# Patient Record
Sex: FEMALE | Race: WHITE | NOT HISPANIC OR LATINO | URBAN - METROPOLITAN AREA
[De-identification: names, ages, dates, MRNs, and addresses within clinical notes are randomized per-mention and may not be internally consistent; named-entity substitution may affect disease eponyms.]

---

## 2023-06-29 DIAGNOSIS — F32.A DEPRESSION, UNSPECIFIED DEPRESSION TYPE: Primary | ICD-10-CM

## 2023-06-29 RX ORDER — PAROXETINE 30 MG/1
TABLET, FILM COATED ORAL
Qty: 90 TABLET | Refills: 2 | Status: SHIPPED | OUTPATIENT
Start: 2023-06-29 | End: 2023-09-20 | Stop reason: SINTOL

## 2023-09-20 ENCOUNTER — OFFICE VISIT (OUTPATIENT)
Dept: PRIMARY CARE | Facility: CLINIC | Age: 33
End: 2023-09-20
Payer: COMMERCIAL

## 2023-09-20 VITALS
BODY MASS INDEX: 19.61 KG/M2 | RESPIRATION RATE: 16 BRPM | OXYGEN SATURATION: 98 % | SYSTOLIC BLOOD PRESSURE: 110 MMHG | TEMPERATURE: 97.8 F | DIASTOLIC BLOOD PRESSURE: 62 MMHG | HEIGHT: 66 IN | WEIGHT: 122 LBS | HEART RATE: 80 BPM

## 2023-09-20 DIAGNOSIS — F41.9 ANXIETY: Primary | ICD-10-CM

## 2023-09-20 DIAGNOSIS — Z20.2 EXPOSURE TO STD: ICD-10-CM

## 2023-09-20 PROBLEM — R68.89 FLU-LIKE SYMPTOMS: Status: RESOLVED | Noted: 2023-09-20 | Resolved: 2023-09-20

## 2023-09-20 PROBLEM — T78.40XA ALLERGIES: Status: ACTIVE | Noted: 2023-09-20

## 2023-09-20 PROBLEM — J02.9 SORE THROAT: Status: RESOLVED | Noted: 2023-09-20 | Resolved: 2023-09-20

## 2023-09-20 PROBLEM — E07.9 THYROID DISORDER: Status: RESOLVED | Noted: 2023-09-20 | Resolved: 2023-09-20

## 2023-09-20 PROCEDURE — 99213 OFFICE O/P EST LOW 20 MIN: CPT | Performed by: FAMILY MEDICINE

## 2023-09-20 PROCEDURE — 87491 CHLMYD TRACH DNA AMP PROBE: CPT

## 2023-09-20 PROCEDURE — 87591 N.GONORRHOEAE DNA AMP PROB: CPT

## 2023-09-20 PROCEDURE — 1036F TOBACCO NON-USER: CPT | Performed by: FAMILY MEDICINE

## 2023-09-20 RX ORDER — ESCITALOPRAM OXALATE 5 MG/1
5 TABLET ORAL DAILY
Qty: 30 TABLET | Refills: 11 | Status: SHIPPED | OUTPATIENT
Start: 2023-09-20 | End: 2024-05-02 | Stop reason: ALTCHOICE

## 2023-09-20 ASSESSMENT — ENCOUNTER SYMPTOMS
EYES NEGATIVE: 1
PSYCHIATRIC NEGATIVE: 1
ENDOCRINE NEGATIVE: 1
GASTROINTESTINAL NEGATIVE: 1
NEUROLOGICAL NEGATIVE: 1
CONSTITUTIONAL NEGATIVE: 1
CARDIOVASCULAR NEGATIVE: 1
RESPIRATORY NEGATIVE: 1

## 2023-09-20 NOTE — PROGRESS NOTES
"Subjective     Patient ID: Leticia Wei is a 32 y.o. female who presents for Anxiety and Exposure to STD.  HPI  Got side effects from paxil. Dilated pupils.  Would like to try a different medication.     Review of Systems   Constitutional: Negative.    HENT: Negative.     Eyes: Negative.    Respiratory: Negative.     Cardiovascular: Negative.    Gastrointestinal: Negative.    Endocrine: Negative.    Genitourinary: Negative.    Skin: Negative.    Neurological: Negative.    Psychiatric/Behavioral: Negative.           Objective     Vitals:    09/20/23 1553   BP: 110/62   BP Location: Left arm   Patient Position: Sitting   Pulse: 80   Resp: 16   Temp: 36.6 °C (97.8 °F)   SpO2: 98%   Weight: 55.3 kg (122 lb)   Height: 1.676 m (5' 6\")        Current Outpatient Medications   Medication Instructions    escitalopram (LEXAPRO) 5 mg, oral, Daily        Physical Exam  Constitutional:       Appearance: Normal appearance.   HENT:      Head: Normocephalic and atraumatic.      Mouth/Throat:      Mouth: Mucous membranes are moist.   Cardiovascular:      Rate and Rhythm: Normal rate and regular rhythm.   Pulmonary:      Effort: Pulmonary effort is normal.      Breath sounds: Normal breath sounds.   Skin:     General: Skin is warm and dry.   Neurological:      Mental Status: She is alert.         Assessment/Plan   Problem List Items Addressed This Visit    None  Visit Diagnoses       Anxiety    -  Primary    Relevant Medications    escitalopram (Lexapro) 5 mg tablet    Exposure to STD        Relevant Orders    C. Trachomatis / N. Gonorrhoeae, Amplified Detection                               "

## 2023-09-21 ENCOUNTER — TELEPHONE (OUTPATIENT)
Dept: PRIMARY CARE | Facility: CLINIC | Age: 33
End: 2023-09-21
Payer: COMMERCIAL

## 2023-09-22 ENCOUNTER — TELEPHONE (OUTPATIENT)
Dept: PRIMARY CARE | Facility: CLINIC | Age: 33
End: 2023-09-22
Payer: COMMERCIAL

## 2023-09-22 DIAGNOSIS — A74.9 CHLAMYDIA: Primary | ICD-10-CM

## 2023-09-22 LAB
CHLAMYDIA TRACH., AMPLIFIED: POSITIVE
N. GONORRHEA, AMPLIFIED: NEGATIVE

## 2023-09-22 RX ORDER — AZITHROMYCIN 250 MG/1
1000 TABLET, FILM COATED ORAL ONCE
Qty: 4 TABLET | Refills: 0 | Status: SHIPPED | OUTPATIENT
Start: 2023-09-22 | End: 2023-09-22

## 2023-09-22 NOTE — TELEPHONE ENCOUNTER
Pt is calling about her lab results. Pt states she can see result on MyChart and has some positive tests. She wants to speak with medical staff asap.

## 2024-01-22 ENCOUNTER — APPOINTMENT (OUTPATIENT)
Dept: PRIMARY CARE | Facility: CLINIC | Age: 34
End: 2024-01-22
Payer: COMMERCIAL

## 2024-05-02 ENCOUNTER — OFFICE VISIT (OUTPATIENT)
Dept: PRIMARY CARE | Facility: CLINIC | Age: 34
End: 2024-05-02
Payer: COMMERCIAL

## 2024-05-02 VITALS
WEIGHT: 138 LBS | TEMPERATURE: 97.9 F | RESPIRATION RATE: 18 BRPM | HEART RATE: 86 BPM | OXYGEN SATURATION: 96 % | DIASTOLIC BLOOD PRESSURE: 78 MMHG | HEIGHT: 65 IN | SYSTOLIC BLOOD PRESSURE: 120 MMHG | BODY MASS INDEX: 22.99 KG/M2

## 2024-05-02 DIAGNOSIS — Z00.00 HEALTHCARE MAINTENANCE: Primary | ICD-10-CM

## 2024-05-02 LAB
POC APPEARANCE, URINE: CLEAR
POC BILIRUBIN, URINE: NEGATIVE
POC BLOOD, URINE: NEGATIVE
POC COLOR, URINE: NORMAL
POC GLUCOSE, URINE: NEGATIVE MG/DL
POC KETONES, URINE: NEGATIVE MG/DL
POC LEUKOCYTES, URINE: NEGATIVE
POC NITRITE,URINE: NEGATIVE
POC PH, URINE: 6 PH
POC PROTEIN, URINE: NEGATIVE MG/DL
POC SPECIFIC GRAVITY, URINE: 1.01
POC UROBILINOGEN, URINE: 0.2 EU/DL

## 2024-05-02 PROCEDURE — 1036F TOBACCO NON-USER: CPT | Performed by: FAMILY MEDICINE

## 2024-05-02 PROCEDURE — 81002 URINALYSIS NONAUTO W/O SCOPE: CPT | Performed by: FAMILY MEDICINE

## 2024-05-02 PROCEDURE — 93000 ELECTROCARDIOGRAM COMPLETE: CPT | Performed by: FAMILY MEDICINE

## 2024-05-02 PROCEDURE — 99395 PREV VISIT EST AGE 18-39: CPT | Performed by: FAMILY MEDICINE

## 2024-05-02 ASSESSMENT — ENCOUNTER SYMPTOMS
NEUROLOGICAL NEGATIVE: 1
GASTROINTESTINAL NEGATIVE: 1
CONSTITUTIONAL NEGATIVE: 1
CARDIOVASCULAR NEGATIVE: 1
RESPIRATORY NEGATIVE: 1
PSYCHIATRIC NEGATIVE: 1
EYES NEGATIVE: 1
ENDOCRINE NEGATIVE: 1

## 2024-05-02 ASSESSMENT — PROMIS GLOBAL HEALTH SCALE
CARRYOUT_PHYSICAL_ACTIVITIES: COMPLETELY
RATE_SOCIAL_SATISFACTION: FAIR
RATE_AVERAGE_PAIN: 3
CARRYOUT_SOCIAL_ACTIVITIES: GOOD
RATE_MENTAL_HEALTH: FAIR
RATE_PHYSICAL_HEALTH: GOOD
RATE_QUALITY_OF_LIFE: GOOD
EMOTIONAL_PROBLEMS: OFTEN
RATE_GENERAL_HEALTH: GOOD
RATE_AVERAGE_FATIGUE: MILD

## 2024-05-02 ASSESSMENT — PATIENT HEALTH QUESTIONNAIRE - PHQ9
1. LITTLE INTEREST OR PLEASURE IN DOING THINGS: NOT AT ALL
SUM OF ALL RESPONSES TO PHQ9 QUESTIONS 1 AND 2: 0
2. FEELING DOWN, DEPRESSED OR HOPELESS: NOT AT ALL

## 2024-05-02 NOTE — PROGRESS NOTES
"Subjective     Patient ID: Leticia Wei is a 33 y.o. female who presents for Annual Exam.  HPI Stopped lexapro because it was not effective. Doing well with lifestyle management.    Review of Systems   Constitutional: Negative.    HENT: Negative.     Eyes: Negative.    Respiratory: Negative.     Cardiovascular: Negative.    Gastrointestinal: Negative.    Endocrine: Negative.    Genitourinary: Negative.    Skin: Negative.    Neurological: Negative.    Psychiatric/Behavioral: Negative.         Objective     Vitals:    05/02/24 1120   BP: 120/78   BP Location: Right arm   Patient Position: Sitting   Pulse: 86   Resp: 18   Temp: 36.6 °C (97.9 °F)   TempSrc: Temporal   SpO2: 96%   Weight: 62.6 kg (138 lb)   Height: 1.651 m (5' 5\")        Current Outpatient Medications   Medication Instructions    escitalopram (LEXAPRO) 5 mg, oral, Daily        Physical Exam  Constitutional:       General: She is not in acute distress.     Appearance: Normal appearance.   HENT:      Head: Normocephalic and atraumatic.      Right Ear: Tympanic membrane normal.      Left Ear: Tympanic membrane normal.      Nose: Nose normal.      Mouth/Throat:      Mouth: Mucous membranes are dry.      Pharynx: Oropharynx is clear.   Eyes:      Conjunctiva/sclera: Conjunctivae normal.      Pupils: Pupils are equal, round, and reactive to light.   Neck:      Vascular: No carotid bruit.   Cardiovascular:      Rate and Rhythm: Normal rate and regular rhythm.      Heart sounds: Normal heart sounds.   Pulmonary:      Effort: Pulmonary effort is normal.      Breath sounds: Normal breath sounds.   Abdominal:      General: Bowel sounds are normal.      Palpations: Abdomen is soft.   Musculoskeletal:      Cervical back: Neck supple. No tenderness.   Skin:     General: Skin is warm and dry.   Neurological:      General: No focal deficit present.      Mental Status: She is alert.   Psychiatric:         Mood and Affect: Mood normal.         Behavior: Behavior " normal.         Assessment/Plan   Diagnoses and all orders for this visit:  Healthcare maintenance  -     POCT UA (nonautomated) manually resulted  -     ECG 12 lead (Clinic Performed)  -     CBC; Future  -     Comprehensive Metabolic Panel; Future  -     Lipid Panel; Future

## 2024-05-16 ENCOUNTER — APPOINTMENT (OUTPATIENT)
Dept: PRIMARY CARE | Facility: CLINIC | Age: 34
End: 2024-05-16
Payer: COMMERCIAL

## 2024-09-30 ENCOUNTER — APPOINTMENT (OUTPATIENT)
Dept: OBSTETRICS AND GYNECOLOGY | Facility: CLINIC | Age: 34
End: 2024-09-30
Payer: COMMERCIAL

## 2024-10-02 DIAGNOSIS — F41.9 ANXIETY: Primary | ICD-10-CM

## 2024-10-02 RX ORDER — ESCITALOPRAM OXALATE 20 MG/1
20 TABLET ORAL DAILY
Qty: 30 TABLET | Refills: 11 | Status: SHIPPED | OUTPATIENT
Start: 2024-10-02 | End: 2025-10-02

## 2024-10-16 ENCOUNTER — APPOINTMENT (OUTPATIENT)
Dept: DERMATOLOGY | Facility: CLINIC | Age: 34
End: 2024-10-16
Payer: COMMERCIAL

## 2024-10-16 DIAGNOSIS — Z12.83 ENCOUNTER FOR SCREENING FOR MALIGNANT NEOPLASM OF SKIN: ICD-10-CM

## 2024-10-16 DIAGNOSIS — D48.5 NEOPLASM OF UNCERTAIN BEHAVIOR OF SKIN: ICD-10-CM

## 2024-10-16 DIAGNOSIS — L81.4 LENTIGO: ICD-10-CM

## 2024-10-16 DIAGNOSIS — D22.9 MELANOCYTIC NEVUS, UNSPECIFIED LOCATION: ICD-10-CM

## 2024-10-16 DIAGNOSIS — L71.9 ROSACEA: Primary | ICD-10-CM

## 2024-10-16 PROCEDURE — 99204 OFFICE O/P NEW MOD 45 MIN: CPT | Performed by: DERMATOLOGY

## 2024-10-16 PROCEDURE — 11102 TANGNTL BX SKIN SINGLE LES: CPT | Performed by: DERMATOLOGY

## 2024-10-16 RX ORDER — PROPRANOLOL HYDROCHLORIDE 10 MG/1
10 TABLET ORAL DAILY
Qty: 30 TABLET | Refills: 11 | Status: SHIPPED | OUTPATIENT
Start: 2024-10-16 | End: 2025-10-16

## 2024-10-16 ASSESSMENT — PATIENT GLOBAL ASSESSMENT (PGA): WHAT IS THE PGA: PATIENT GLOBAL ASSESSMENT:  1 - CLEAR

## 2024-10-16 ASSESSMENT — DERMATOLOGY QUALITY OF LIFE (QOL) ASSESSMENT
WHAT SINGLE SKIN CONDITION LISTED BELOW IS THE PATIENT ANSWERING THE QUALITY-OF-LIFE ASSESSMENT QUESTIONS ABOUT: NONE OF THE ABOVE
RATE HOW BOTHERED YOU ARE BY SYMPTOMS OF YOUR SKIN PROBLEM (EG, ITCHING, STINGING BURNING, HURTING OR SKIN IRRITATION): 2
RATE HOW BOTHERED YOU ARE BY SYMPTOMS OF YOUR SKIN PROBLEM (EG, ITCHING, STINGING BURNING, HURTING OR SKIN IRRITATION): 2
RATE HOW BOTHERED YOU ARE BY EFFECTS OF YOUR SKIN PROBLEMS ON YOUR ACTIVITIES (EG, GOING OUT, ACCOMPLISHING WHAT YOU WANT, WORK ACTIVITIES OR YOUR RELATIONSHIPS WITH OTHERS): 0 - NEVER BOTHERED
RATE HOW BOTHERED YOU ARE BY EFFECTS OF YOUR SKIN PROBLEMS ON YOUR ACTIVITIES (EG, GOING OUT, ACCOMPLISHING WHAT YOU WANT, WORK ACTIVITIES OR YOUR RELATIONSHIPS WITH OTHERS): 0 - NEVER BOTHERED
WHAT SINGLE SKIN CONDITION LISTED BELOW IS THE PATIENT ANSWERING THE QUALITY-OF-LIFE ASSESSMENT QUESTIONS ABOUT: NONE OF THE ABOVE
DATE THE QUALITY-OF-LIFE ASSESSMENT WAS COMPLETED: 67129
RATE HOW EMOTIONALLY BOTHERED YOU ARE BY YOUR SKIN PROBLEM (FOR EXAMPLE, WORRY, EMBARRASSMENT, FRUSTRATION): 0 - NEVER BOTHERED
RATE HOW EMOTIONALLY BOTHERED YOU ARE BY YOUR SKIN PROBLEM (FOR EXAMPLE, WORRY, EMBARRASSMENT, FRUSTRATION): 0 - NEVER BOTHERED

## 2024-10-16 NOTE — PROGRESS NOTES
Subjective     Leticia Wei is a 33 y.o. female who presents for the following: Skin Check (New. No reported personal or family hx. ).     Skin Cancer Screening  She has a history of heavy sun exposure. She is in the sun frequently. She uses sunscreen frequently. She reports no skin symptoms. Her moles are not changing.    Spots that concern her: Left Upper Arm.           Review of Systems:  No other skin or systemic complaints other than what is documented elsewhere in the note.    The following portions of the chart were reviewed this encounter and updated as appropriate:       Skin Cancer History  No skin cancer on file.    Specialty Problems    None    Past Medical History:  Leticia Wei  has a past medical history of ADHD (attention deficit hyperactivity disorder), Anxiety, Flu-like symptoms (09/20/2023), and Thyroid disorder (09/20/2023).    Past Surgical History:  Leticia Wei  has a past surgical history that includes Other surgical history (06/18/2020).    Family History:  Patient family history is not on file.       Objective   Well appearing patient in no apparent distress; mood and affect are within normal limits.    A full examination was performed including scalp, head, eyes, ears, nose, lips, neck, chest, axillae, abdomen, back, buttocks, bilateral upper extremities, bilateral lower extremities, hands, feet, fingers, toes, fingernails, and toenails. All findings within normal limits unless otherwise noted below.    Assessment/Plan   1. Melanocytic nevus, unspecified location  All nevi were symmetric brown macules without atypia on dermoscopy.     The ABCDEs of melanoma and warning signs of non-melanoma skin cancer were discussed with patient and patient expressed understanding. Sun protection and use of at least SPF 30 discussed with patient. Pt instructed to reapply every 2 hours.     2. Lentigo  Scattered tan macules in sun-exposed areas.    The ABCDEs of melanoma and warning signs of  non-melanoma skin cancer were discussed with patient and patient expressed understanding. Sun protection and use of at least SPF 30 discussed with patient. Pt instructed to reapply every 2 hours.     3. Neoplasm of uncertain behavior of skin  Left Lower Back  Variegated brown macule          Lesion biopsy  Type of biopsy: tangential    Informed consent: discussed and consent obtained    Timeout: patient name, date of birth, surgical site, and procedure verified    Procedure prep:  Patient was prepped and draped  Anesthesia: the lesion was anesthetized in a standard fashion    Anesthetic:  1% lidocaine w/ epinephrine 1-100,000 local infiltration  Instrument used: DermaBlade    Hemostasis achieved with: aluminum chloride    Outcome: patient tolerated procedure well    Post-procedure details: sterile dressing applied and wound care instructions given    Dressing type: petrolatum and bandage      Staff Communication: Dermatology Local Anesthesia: 1 % Lidocaine / Epinephrine - Amount: 1 mL    Specimen 1 - Dermatopathology- DERM LAB  Differential Diagnosis: r/o dysplastic nevus vs melanoma  Check Margins Yes/No?:    Comments:    Dermpath Lab: Routine Histopathology (formalin-fixed tissue)    4. Rosacea  Head - Anterior (Face)  Mid face erythema with telangiectasias and significant flushing that interferes with her everyday life.     The nature of the diagnosis was explained to patient. Will plan to treat with propranolol since it is flushing vs fixed telangectasias that are more of the issue. If no improvement, can also consider increasing dose vs vbeam. Risks, benefits, side effects, alternatives and options were discussed with patient and the patient voiced understanding.    Pt agrees with plan as above.       Related Medications  propranolol (Inderal) 10 mg tablet  Take 1 tablet (10 mg) by mouth once daily.       Follow up pending biopsy results.

## 2024-10-18 LAB
LABORATORY COMMENT REPORT: NORMAL
PATH REPORT.FINAL DX SPEC: NORMAL
PATH REPORT.GROSS SPEC: NORMAL
PATH REPORT.RELEVANT HX SPEC: NORMAL
PATH REPORT.TOTAL CANCER: NORMAL

## 2025-01-07 ENCOUNTER — APPOINTMENT (OUTPATIENT)
Dept: OBSTETRICS AND GYNECOLOGY | Facility: CLINIC | Age: 35
End: 2025-01-07
Payer: COMMERCIAL

## 2025-04-01 ENCOUNTER — APPOINTMENT (OUTPATIENT)
Dept: OBSTETRICS AND GYNECOLOGY | Facility: CLINIC | Age: 35
End: 2025-04-01
Payer: COMMERCIAL

## 2025-04-14 ENCOUNTER — APPOINTMENT (OUTPATIENT)
Dept: OBSTETRICS AND GYNECOLOGY | Facility: CLINIC | Age: 35
End: 2025-04-14
Payer: COMMERCIAL

## 2025-05-20 DIAGNOSIS — G47.00 INSOMNIA, UNSPECIFIED TYPE: Primary | ICD-10-CM

## 2025-05-20 RX ORDER — TRAZODONE HYDROCHLORIDE 50 MG/1
50 TABLET ORAL NIGHTLY PRN
Qty: 30 TABLET | Refills: 5 | Status: SHIPPED | OUTPATIENT
Start: 2025-05-20 | End: 2026-05-20

## 2025-05-26 ENCOUNTER — OFFICE VISIT (OUTPATIENT)
Dept: URGENT CARE | Age: 35
End: 2025-05-26
Payer: COMMERCIAL

## 2025-05-26 VITALS
HEART RATE: 67 BPM | BODY MASS INDEX: 22.96 KG/M2 | OXYGEN SATURATION: 100 % | WEIGHT: 138 LBS | SYSTOLIC BLOOD PRESSURE: 142 MMHG | DIASTOLIC BLOOD PRESSURE: 102 MMHG | TEMPERATURE: 98 F | RESPIRATION RATE: 16 BRPM

## 2025-05-26 DIAGNOSIS — S01.81XA LACERATION OF FOREHEAD, INITIAL ENCOUNTER: Primary | ICD-10-CM

## 2025-05-26 PROCEDURE — 1036F TOBACCO NON-USER: CPT | Performed by: FAMILY MEDICINE

## 2025-05-26 PROCEDURE — 99203 OFFICE O/P NEW LOW 30 MIN: CPT | Performed by: FAMILY MEDICINE

## 2025-05-26 NOTE — PROGRESS NOTES
Subjective   Patient ID: Leticia Wei is a 34 y.o. female. They present today with a chief complaint of Injury (Had a fall on 3 days ago has a laceration on forehead).    Patient disposition: Home    History of Present Illness  HPI  Forehead laceration from 3 days ago.  Patient has recently started a new sleeping pill, trazodone.  Went home, took a pill and took her dog for a walk.  Fell asleep but then woke up in the morning with a laceration on her left forehead.  Has been keeping it clean, apply Neosporin.  No headache, dizziness new symptoms.  Denies any injury overnight but thinks she may have gotten up overnight.      Past Medical History  Allergies as of 05/26/2025 - Reviewed 05/26/2025   Allergen Reaction Noted    Penicillins Unknown 09/20/2023       Prescriptions Prior to Admission[1]     Current Medications[2]    Problem List[3]    Surgical History[4]     reports that she has never smoked. She has never used smokeless tobacco. She reports current alcohol use. She reports that she does not use drugs.    Review of Systems  As noted in HPI. ROS otherwise negative unless noted.       Objective    Vitals:    05/26/25 0945   BP: (!) 142/102   Pulse: 67   Resp: 16   Temp: 36.7 °C (98 °F)   SpO2: 100%   Weight: 62.6 kg (138 lb)     Patient's last menstrual period was 05/15/2025.    Physical Exam  Constitutional: vital signs reviewed. Well developed, well nourished. patient alert and patient without distress.   Psych: Normal mood and affect  Skin: Normal skin color and pigmentation, normal skin turgor, and no rash.  Left forehead with 2 cm diagonal laceration, linear, slightly dehisced about 2 mm and has already scabbed over.  No dehiscence.  Area was further cleansed and scrubbed with alcohol and peroxide to help open the wound.  There is no bleeding.  Healing tissue present.  Tincture and Steri-Strips were applied to the area with better approximation of the wound.  Lymphatic: No extremity  edema  Cardiovascular: No edema in the extremities. Normal skin color/perfusion.   Pulmonary: Skin without cyanosis. Patient without respiratory distress. Speaking in full sentences.  Musculoskeletal: Normal gait and stance, balance and coordination without gross deficit.        Procedures    Point of Care Test & Imaging Results from this visit           Diagnostic study results (if any) were reviewed.  (If applicable) preliminary radiology reading: None    Assessment/Plan   Allergies, medications, history, and pertinent labs/EKGs/Imaging reviewed.        Medical Decision Making  See note    Orders and Diagnoses  There are no diagnoses linked to this encounter.    Medical Admin Record      Follow Up Instructions  No follow-ups on file.    At time of discharge patient was clinically well-appearing and HDS for outpatient management. The patient and/or family was educated regarding diagnosis, supportive care, OTC and Rx medications. The patient and/or family was given the opportunity to ask questions prior to discharge and all questions answered. They verbalized understanding of my discussion of the plans for treatment, expected course, indications to return to  or seek further evaluation in ED, and the need for timely follow up as directed.      Electronically signed by [facility]           [1] (Not in a hospital admission)  [2]   Current Outpatient Medications   Medication Sig Dispense Refill    traZODone (Desyrel) 50 mg tablet Take 1 tablet (50 mg) by mouth as needed at bedtime for sleep. 30 tablet 5    escitalopram (Lexapro) 20 mg tablet Take 1 tablet (20 mg) by mouth once daily. 30 tablet 11    propranolol (Inderal) 10 mg tablet Take 1 tablet (10 mg) by mouth once daily. (Patient not taking: Reported on 5/26/2025) 30 tablet 11     No current facility-administered medications for this visit.   [3]   Patient Active Problem List  Diagnosis    Allergies   [4]   Past Surgical History:  Procedure Laterality  Date    OTHER SURGICAL HISTORY  06/18/2020    No history of surgery

## 2025-05-26 NOTE — PATIENT INSTRUCTIONS
Strip bandages were applied to the area, they should fall off in about 3 to 5 days.  Avoid getting them wet.  Keep them clean otherwise.    Once the strips fall off, apply vitamin E ointment to help reduce scarring.    In the future, any laceration sustained, follow-up immediately for proper closure.

## 2025-05-27 ENCOUNTER — APPOINTMENT (OUTPATIENT)
Dept: OBSTETRICS AND GYNECOLOGY | Facility: CLINIC | Age: 35
End: 2025-05-27
Payer: COMMERCIAL

## 2025-08-06 ASSESSMENT — PROMIS GLOBAL HEALTH SCALE
RATE_GENERAL_HEALTH: GOOD
RATE_AVERAGE_PAIN: 3
CARRYOUT_SOCIAL_ACTIVITIES: GOOD
EMOTIONAL_PROBLEMS: ALWAYS
RATE_SOCIAL_SATISFACTION: GOOD
RATE_MENTAL_HEALTH: FAIR
RATE_PHYSICAL_HEALTH: GOOD
RATE_QUALITY_OF_LIFE: GOOD
RATE_AVERAGE_FATIGUE: MODERATE
CARRYOUT_PHYSICAL_ACTIVITIES: COMPLETELY

## 2025-08-07 ENCOUNTER — APPOINTMENT (OUTPATIENT)
Facility: CLINIC | Age: 35
End: 2025-08-07
Payer: COMMERCIAL

## 2025-08-07 VITALS
SYSTOLIC BLOOD PRESSURE: 110 MMHG | DIASTOLIC BLOOD PRESSURE: 64 MMHG | BODY MASS INDEX: 22.82 KG/M2 | RESPIRATION RATE: 18 BRPM | TEMPERATURE: 97.6 F | HEIGHT: 65 IN | HEART RATE: 64 BPM | WEIGHT: 137 LBS | OXYGEN SATURATION: 98 %

## 2025-08-07 DIAGNOSIS — F41.9 ANXIETY: Primary | ICD-10-CM

## 2025-08-07 DIAGNOSIS — Z13.6 SCREENING FOR CARDIOVASCULAR CONDITION: ICD-10-CM

## 2025-08-07 DIAGNOSIS — Z00.00 HEALTHCARE MAINTENANCE: ICD-10-CM

## 2025-08-07 RX ORDER — FLUOXETINE 20 MG/1
20 CAPSULE ORAL DAILY
Qty: 90 CAPSULE | Refills: 3 | Status: SHIPPED | OUTPATIENT
Start: 2025-08-07 | End: 2026-08-07

## 2025-08-07 ASSESSMENT — ENCOUNTER SYMPTOMS
NEUROLOGICAL NEGATIVE: 1
RESPIRATORY NEGATIVE: 1
CONSTITUTIONAL NEGATIVE: 1
CARDIOVASCULAR NEGATIVE: 1
PSYCHIATRIC NEGATIVE: 1
GASTROINTESTINAL NEGATIVE: 1
EYES NEGATIVE: 1
ENDOCRINE NEGATIVE: 1

## 2025-08-07 ASSESSMENT — PATIENT HEALTH QUESTIONNAIRE - PHQ9
SUM OF ALL RESPONSES TO PHQ9 QUESTIONS 1 AND 2: 0
1. LITTLE INTEREST OR PLEASURE IN DOING THINGS: NOT AT ALL
2. FEELING DOWN, DEPRESSED OR HOPELESS: NOT AT ALL

## 2025-08-07 NOTE — PROGRESS NOTES
"Subjective     Patient ID: Leticia Wei is a 34 y.o. female who presents for Annual Exam.  HPI wants to switch her ssri to prozac, per her therapists request. Has left arm tingling only when standing. Not there when lying down or running.     Review of Systems   Constitutional: Negative.    HENT: Negative.     Eyes: Negative.    Respiratory: Negative.     Cardiovascular: Negative.    Gastrointestinal: Negative.    Endocrine: Negative.    Genitourinary: Negative.    Skin: Negative.    Neurological: Negative.    Psychiatric/Behavioral: Negative.         Objective     Vitals:    08/07/25 1546   BP: 110/64   Pulse: 64   Resp: 18   Temp: 36.4 °C (97.6 °F)   TempSrc: Temporal   SpO2: 98%   Weight: 62.1 kg (137 lb)   Height: 1.651 m (5' 5\")        Current Outpatient Medications   Medication Instructions    FLUoxetine (PROZAC) 20 mg, oral, Daily    traZODone (DESYREL) 50 mg, oral, Nightly PRN        Physical Exam  Constitutional:       General: She is not in acute distress.     Appearance: Normal appearance.   HENT:      Head: Normocephalic and atraumatic.      Right Ear: Tympanic membrane normal.      Left Ear: Tympanic membrane normal.      Nose: Nose normal.      Mouth/Throat:      Pharynx: Oropharynx is clear.     Eyes:      Conjunctiva/sclera: Conjunctivae normal.      Pupils: Pupils are equal, round, and reactive to light.     Neck:      Vascular: No carotid bruit.     Cardiovascular:      Rate and Rhythm: Normal rate and regular rhythm.      Heart sounds: Normal heart sounds.   Pulmonary:      Effort: Pulmonary effort is normal.      Breath sounds: Normal breath sounds.   Abdominal:      General: Bowel sounds are normal.      Palpations: Abdomen is soft.     Musculoskeletal:      Cervical back: Neck supple. No tenderness.     Skin:     General: Skin is warm and dry.     Neurological:      General: No focal deficit present.      Mental Status: She is alert.     Psychiatric:         Mood and Affect: Mood normal.   "       Behavior: Behavior normal.         Assessment/Plan   Assessment & Plan  Anxiety    Orders:    FLUoxetine (PROzac) 20 mg capsule; Take 1 capsule (20 mg) by mouth once daily.    Healthcare maintenance    Orders:    CBC; Future    Comprehensive Metabolic Panel; Future    Lipid Panel; Future    Screening for cardiovascular condition    Orders:    ECG 12 Lead

## 2025-08-22 ENCOUNTER — APPOINTMENT (OUTPATIENT)
Dept: OBSTETRICS AND GYNECOLOGY | Facility: CLINIC | Age: 35
End: 2025-08-22
Payer: COMMERCIAL

## 2025-10-22 ENCOUNTER — APPOINTMENT (OUTPATIENT)
Dept: DERMATOLOGY | Facility: CLINIC | Age: 35
End: 2025-10-22
Payer: COMMERCIAL

## 2025-11-05 ENCOUNTER — APPOINTMENT (OUTPATIENT)
Dept: OBSTETRICS AND GYNECOLOGY | Facility: CLINIC | Age: 35
End: 2025-11-05
Payer: COMMERCIAL